# Patient Record
Sex: MALE | Race: ASIAN | Employment: FULL TIME | URBAN - METROPOLITAN AREA
[De-identification: names, ages, dates, MRNs, and addresses within clinical notes are randomized per-mention and may not be internally consistent; named-entity substitution may affect disease eponyms.]

---

## 2023-11-01 ENCOUNTER — HOSPITAL ENCOUNTER (EMERGENCY)
Facility: HOSPITAL | Age: 28
Discharge: HOME | End: 2023-11-01
Attending: EMERGENCY MEDICINE
Payer: COMMERCIAL

## 2023-11-01 VITALS
SYSTOLIC BLOOD PRESSURE: 150 MMHG | HEIGHT: 69 IN | TEMPERATURE: 99 F | WEIGHT: 174.19 LBS | DIASTOLIC BLOOD PRESSURE: 83 MMHG | HEART RATE: 91 BPM | RESPIRATION RATE: 17 BRPM | OXYGEN SATURATION: 98 % | BODY MASS INDEX: 25.8 KG/M2

## 2023-11-01 DIAGNOSIS — J06.9 UPPER RESPIRATORY TRACT INFECTION, UNSPECIFIED TYPE: Primary | ICD-10-CM

## 2023-11-01 DIAGNOSIS — R09.81 NASAL CONGESTION: ICD-10-CM

## 2023-11-01 PROCEDURE — 99283 EMERGENCY DEPT VISIT LOW MDM: CPT

## 2023-11-01 PROCEDURE — 99284 EMERGENCY DEPT VISIT MOD MDM: CPT

## 2023-11-01 RX ORDER — GUAIFENESIN 600 MG/1
1200 TABLET, EXTENDED RELEASE ORAL 2 TIMES DAILY
Qty: 10 TABLET | Refills: 0 | Status: SHIPPED | OUTPATIENT
Start: 2023-11-01

## 2023-11-01 RX ORDER — IBUPROFEN 600 MG/1
600 TABLET ORAL EVERY 8 HOURS PRN
Qty: 30 TABLET | Refills: 0 | Status: SHIPPED | OUTPATIENT
Start: 2023-11-01 | End: 2023-11-08

## 2023-11-01 RX ORDER — PSEUDOEPHEDRINE HCL 30 MG
30 TABLET ORAL EVERY 12 HOURS PRN
Qty: 14 TABLET | Refills: 0 | Status: SHIPPED | OUTPATIENT
Start: 2023-11-01 | End: 2023-11-08

## 2023-11-01 RX ORDER — IBUPROFEN 600 MG/1
600 TABLET ORAL ONCE
Status: COMPLETED | OUTPATIENT
Start: 2023-11-01 | End: 2023-11-01

## 2023-11-01 NOTE — DISCHARGE INSTRUCTIONS
Please discontinue the use of oxymetolazone as this can count rebound congestion as you have been using it for greater than 3 to 4 days. You may try the above decongestant and expectorant for symptomatic relief. As discussed, you have a viral illness. Unfortunately there are no specific medications we can give you to make the illness and faster. Antibiotics do not work for viral illnesses. However you can take acetaminophen or ibuprofen to help with fevers and pain. Stay well-hydrated and rested. Return to the emergency department if your fevers and chills continue to worsen after 5 days, if you develop worsening cough with thick sputum, or unable to stay well-hydrated. Contact your primary care provider in the next few days for reevaluation and to make sure your symptoms are improving.

## 2023-11-01 NOTE — ED INITIAL ASSESSMENT (HPI)
Cough/cold symptoms x3-4 days. Seen at urgent care yesterday. Neg Covid test yesterday, Given flonase nasal spray last night; since then nasal passages more congested and left ear pain.

## 2024-04-22 ENCOUNTER — APPOINTMENT (OUTPATIENT)
Dept: GENERAL RADIOLOGY | Age: 29
End: 2024-04-22
Attending: NURSE PRACTITIONER
Payer: COMMERCIAL

## 2024-04-22 ENCOUNTER — HOSPITAL ENCOUNTER (OUTPATIENT)
Age: 29
Discharge: HOME | End: 2024-04-22
Payer: COMMERCIAL

## 2024-04-22 VITALS
RESPIRATION RATE: 16 BRPM | DIASTOLIC BLOOD PRESSURE: 83 MMHG | TEMPERATURE: 98 F | HEART RATE: 76 BPM | SYSTOLIC BLOOD PRESSURE: 133 MMHG | OXYGEN SATURATION: 99 %

## 2024-04-22 DIAGNOSIS — S53.402A SPRAIN OF LEFT UPPER ARM, INITIAL ENCOUNTER: Primary | ICD-10-CM

## 2024-04-22 PROCEDURE — 73030 X-RAY EXAM OF SHOULDER: CPT | Performed by: NURSE PRACTITIONER

## 2024-04-22 PROCEDURE — 73060 X-RAY EXAM OF HUMERUS: CPT | Performed by: NURSE PRACTITIONER

## 2024-04-22 PROCEDURE — 99214 OFFICE O/P EST MOD 30 MIN: CPT

## 2024-04-22 PROCEDURE — 99213 OFFICE O/P EST LOW 20 MIN: CPT

## 2024-04-22 NOTE — ED INITIAL ASSESSMENT (HPI)
Patient arrived ambulatory to room c/o left shoulder/left upper arm pain. Patient states he was bunjee jumping 10 days ago. Patient states he believes the rope wrapped around his arm. +LOM of the left arm. Strong radial pulse. No obvious deformity.

## 2024-04-22 NOTE — ED PROVIDER NOTES
Patient Seen in: [facility] [place]      History     Chief Complaint   Patient presents with    Arm Injury     Stated Complaint: shoulder pain-fall  Subjective:   28-year-old healthy male presents for a left upper arm and shoulder injury that occurred 2 weeks ago.  He states he was bungee jumping in [place].  He states that when he fell hurt his arm.  He was having pain to the proximal humerus and shoulder joint with range of motion.  He states the most discomfort comes from putting his left arm over his head.  There is no swelling.  No erythema or signs of infection.  No numbness or tingling.  No change in sensation.  He is right-hand dominant.  No rashes or skin abnormalities.  No neck pain.  He appears nontoxic.      Objective:   History reviewed. No pertinent past medical history.         History reviewed. No pertinent surgical history.           Social History     Socioeconomic History    Marital status:    Tobacco Use    Smoking status: Never    Smokeless tobacco: Never   Vaping Use    Vaping status: Never Used   Substance and Sexual Activity    Alcohol use: Never    Drug use: Never            Review of Systems    Positive for stated complaint: shoulder pain-fall  Other systems are as noted in HPI.  Constitutional and vital signs reviewed.      All other systems reviewed and negative except as noted above.    Physical Exam     ED Triage Vitals [04/22/24 1713]   /83   Pulse 76   Resp 16   Temp 97.9 °F (36.6 °C)   Temp src Temporal   SpO2 99 %   O2 Device None (Room air)     Current:/83   Pulse 76   Temp 97.9 °F (36.6 °C) (Temporal)   Resp 16   SpO2 99%     Physical Exam  Vitals and nursing note reviewed.   Constitutional:       General: He is not in acute distress.     Appearance: Normal appearance. He is not toxic-appearing.   HENT:      Mouth/Throat:      Mouth: Mucous membranes are moist.   Eyes:      Pupils: Pupils are equal, round, and reactive to light.   Cardiovascular:       Rate and Rhythm: Normal rate and regular rhythm.   Pulmonary:      Effort: Pulmonary effort is normal.      Breath sounds: Normal breath sounds.   Musculoskeletal:      Right shoulder: Normal.      Left shoulder: Tenderness and bony tenderness present. No swelling, deformity, effusion, laceration or crepitus. Decreased range of motion. Normal strength. Normal pulse.      Cervical back: Normal range of motion and neck supple. No rigidity or tenderness.      Comments: Left shoulder and proximal humerus pain when putting the left arm over his head.  No deformity.  No clavicle tenderness.  No swelling.  Strong left radial pulse.   Skin:     General: Skin is warm and dry.      Capillary Refill: Capillary refill takes less than 2 seconds.      Findings: No bruising, erythema, lesion or rash.   Neurological:      General: No focal deficit present.      Mental Status: He is alert and oriented to person, place, and time.   Psychiatric:         Mood and Affect: Mood normal.         Behavior: Behavior normal.         ED Course   XR HUMERUS (MIN 2 VIEWS), LEFT (CPT=73060)    Result Date: 4/22/2024  CONCLUSION:   No radiographically visible acute osseous injury of the left humerus.   elm-remote  Dictated by (CST): Brian Schulte MD on 4/22/2024 at 5:53 PM     Finalized by (CST): Brian Schulte MD on 4/22/2024 at 5:54 PM          XR SHOULDER, COMPLETE (MIN 2 VIEWS), LEFT (CPT=73030)    Result Date: 4/22/2024  CONCLUSION:   No radiographically visible acute osseous injury of the left shoulder.    elm-remote  Dictated by (CST): Brian Schulte MD on 4/22/2024 at 5:52 PM     Finalized by (CST): Brian Schulte MD on 4/22/2024 at 5:52 PM         Labs Reviewed - No data to display      MDM       Medical Decision Making  The patient is aware of the x-ray results.  He has a sling at home.  We also discussed supportive care measures including ice, elevation, NSAIDs, IcyHot, or lidocaine patches.  He will continue supportive care  and I will refer him to Ortho for follow-up.    Amount and/or Complexity of Data Reviewed  Independent Historian: spouse  Radiology: ordered.     Details: I visualized the x-rays of the left shoulder and humerus myself.  There is no acute fracture of either.    Risk  OTC drugs.  Risk Details: Left arm fracture versus sprain/strain        Disposition and Plan     Clinical Impression:  1. Sprain of left upper arm, initial encounter         Disposition:  Discharge  4/22/2024  6:02 pm    Follow-up:  Zee Mcnally PA  [address] [place] [address]  [phone]    Schedule an appointment as soon as possible for a visit   As needed          Medications Prescribed:  Discharge Medication List as of 4/22/2024  6:02 PM

## 2024-04-22 NOTE — DISCHARGE INSTRUCTIONS
Continue ice, elevation, rest, and NSAIDs as needed for pain.  Follow-up as needed with orthopedics.  Return for any concerns.

## 2025-04-21 NOTE — H&P
The 21st Century Cures Act makes medical notes like these available to patients in the interest of transparency. Please be advised this is a medical document. Medical documents are intended to carry relevant information, facts as evident, and the clinical opinion of the practitioner. The medical note is intended as peer to peer communication and may appear blunt or direct. It is written in medical language and may contain abbreviations or verbiage that are unfamiliar.       Jw Silverio is a 29 year old male who presents for a complete physical exam.   HPI:   Pt complains of scar tissues on back.     Current Medications[1]   Past Medical History[2]   Past Surgical History[3]   Family History[4]   Social History:  Short Social Hx on File[5]      REVIEW OF SYSTEMS:   GENERAL: feels well otherwise  SKIN: scar tissues in back  EYES:denies blurred vision or double vision  HEENT: denies nasal congestion, sinus pain or ST, denies changes in hearing or vision  LUNGS: denies shortness of breath with exertion or frequent cough  CV: denies chest pain, pressure or palpitations  GI: denies abdominal pain,denies heartburn, denies chronic diarrhea or constipation  : denies nocturia or changes in stream, denies erectile dysfunction  MS: denies back pain, arthralgias or myalgias  NEURO: denies headaches or dizziness  PSYCH: denies depression or anxiety  HEMATOLOGIC: denies hx of anemia, easy bruising or bleeding  ENDOCRINE:denies frequent thirst or urination, denies unintentional weight gain/loss  ALL/ASTHMA: denies hx of allergy or asthma    EXAM:   /78   Pulse 71   Resp 16   Ht 5' 9\" (1.753 m)   Wt 176 lb (79.8 kg)   SpO2 98%   BMI 25.99 kg/m²   Body mass index is 25.99 kg/m².     GENERAL: well developed, well nourished,in no apparent distress  SKIN: scar tissues on back area, keratosis pilaris b/l arm.  HEENT: atraumatic, normocephalic, PERRLA, conjunctiva clear, TMs clear, posterior pharynx clear, no  congestion  NECK: supple,no adenopathy,no thyromegaly  LUNGS: clear to auscultation, easy breathing  CV: S1S2, RRR without murmur  GI: good BS's;no masses, HSM or tenderness  : two descended testes,no scrotal tenderness or mass, normal penis no lesions, no hernia  EXT: no cyanosis, clubbing or edema  NEURO: Oriented times three, strength 5/5 x 4 ext, LE DTRs 2+  PSYCH: mood and affect appropriate    ASSESSMENT AND PLAN:   Jw Silverio is a 29 year old male who presents for a complete physical exam. Recommended heart healthy diet, routine exercise, and annual flu vaccines.  Routine testicular exams reinforced. The patient indicates understanding of these issues and agrees to the plan.  Follow up in 1 year.    1. Annual physical exam  - Lipid Panel; Future  - CBC With Differential With Platelet; Future  - Comp Metabolic Panel (14); Future  - TSH W Reflex To Free T4; Future  - Vitamin D; Future    2. Vitamin D deficiency  - Vitamin D; Future    3. Scar tissue  - Derm Referral - In Network    Orders Placed This Encounter   Procedures    Lipid Panel     Standing Status:   Future     Expected Date:   4/21/2025     Expiration Date:   4/21/2026    CBC With Differential With Platelet     Standing Status:   Future     Expected Date:   4/21/2025     Expiration Date:   4/21/2026    Comp Metabolic Panel (14)     Standing Status:   Future     Expected Date:   4/21/2025     Expiration Date:   4/21/2026    TSH W Reflex To Free T4     Standing Status:   Future     Expected Date:   4/21/2025     Expiration Date:   4/21/2026    Vitamin D     Standing Status:   Future     Expected Date:   4/21/2025     Expiration Date:   4/21/2026     Please pick the scenario that best fits the purpose for ordering this test:   General Screening/Vit D deficiency (25-Hydroxy)     Release to patient:   Immediate             [1]   Current Outpatient Medications   Medication Sig Dispense Refill    guaiFENesin  MG Oral Tablet 12 Hr Take 2 tablets  (1,200 mg total) by mouth 2 (two) times daily. (Patient not taking: Reported on 4/22/2024) 10 tablet 0   [2] History reviewed. No pertinent past medical history.  [3] History reviewed. No pertinent surgical history.  [4] History reviewed. No pertinent family history.  [5]   Social History  Socioeconomic History    Marital status:    Tobacco Use    Smoking status: Never    Smokeless tobacco: Never   Vaping Use    Vaping status: Never Used   Substance and Sexual Activity    Alcohol use: Never    Drug use: Never     Social Drivers of Health     Food Insecurity: No Food Insecurity (4/21/2025)    NCSS - Food Insecurity     Worried About Running Out of Food in the Last Year: No     Ran Out of Food in the Last Year: No   Transportation Needs: No Transportation Needs (4/21/2025)    NCSS - Transportation     Lack of Transportation: No   Housing Stability: Not At Risk (4/21/2025)    NCSS - Housing/Utilities     Has Housing: Yes     Worried About Losing Housing: No     Unable to Get Utilities: No

## 2025-04-21 NOTE — PATIENT INSTRUCTIONS
Get cream for keratosis pilaris arms.  Get a urea and lactic acid cream.  Or a urea salicylic acid cream for this.